# Patient Record
Sex: FEMALE | Race: WHITE | Employment: FULL TIME | URBAN - NONMETROPOLITAN AREA
[De-identification: names, ages, dates, MRNs, and addresses within clinical notes are randomized per-mention and may not be internally consistent; named-entity substitution may affect disease eponyms.]

---

## 2018-09-25 ENCOUNTER — HOSPITAL ENCOUNTER (OUTPATIENT)
Dept: PHYSICAL THERAPY | Age: 43
Setting detail: THERAPIES SERIES
Discharge: HOME | End: 2018-09-25
Payer: OTHER GOVERNMENT

## 2018-09-25 PROCEDURE — G8979 MOBILITY GOAL STATUS: HCPCS

## 2018-09-25 PROCEDURE — 97110 THERAPEUTIC EXERCISES: CPT

## 2018-09-25 PROCEDURE — 97162 PT EVAL MOD COMPLEX 30 MIN: CPT

## 2018-09-25 PROCEDURE — G0283 ELEC STIM OTHER THAN WOUND: HCPCS

## 2018-09-25 PROCEDURE — G8978 MOBILITY CURRENT STATUS: HCPCS

## 2018-09-25 ASSESSMENT — PAIN DESCRIPTION - LOCATION: LOCATION: KNEE

## 2018-09-25 ASSESSMENT — PAIN DESCRIPTION - ORIENTATION: ORIENTATION: LEFT

## 2018-09-25 ASSESSMENT — PAIN SCALES - GENERAL: PAINLEVEL_OUTOF10: 5

## 2018-09-25 NOTE — PLAN OF CARE
[facility]           Phone: [phone]             Outpatient Physical Therapy  Fax: [phone]                                           Date: 2018  Patient: Sukh Rao : 1975 CSN #: [id number]   Referring Practitioner:  Aura Ayala NP Referral Date:  18       [x] Plan of Care   [] Updated Plan of Care    Dates of Service to Include: 2018 to 18    Diagnosis:  lateral dislocation of left patella (S83.015D)    Rehab (Treatment) Diagnosis:  L knee pain, difficulty walking             Onset Date:  18    Attendance  Total # of Visits to Date: 1 No Show: 0 Canceled Appointment: 0    Assessment  Assessment: Pt is a 38 y/o female who presents to PT with L knee pain following patellar dislocation which occured on 18 while dancing. Pt currently demonstrates limited knee flexion ROM, edema in L knee compared bilaterally with circumfrential measurements, gait impairments and decreased overall functional mobility. Pt ambulating with immobilizer on LLE upon arrival to PT. Ligamentous testing for L knee, primarily MCL and ACL testing, were both negative but potential limitations d/t guarding, apprehension and edema limiting accuracy of results. Pt mod/severe TTP in L medial HS region as well as medial knee joint. Patellar mobility limited both sup/inf planes as well as med/lat when compared bilaterally. Pt reports RTD on Friday, . Pt instructed in HEP for quad strengthening and knee ROM, with good understanding and return demo of all exercises. Pt will benefit from skilled PT services to address the above impairments and to work toward established functional goals.      [x] Primary Impairment :   G Code:    [x] Mobility         [] Carry        [] Body Position       [] Self Care      [] Other:   Functional Impairment Current:  [] 0%    [] 1-19% [] 20-39% [] 40-59% [x] 60-79% [] 80-99% [] 100%  Functional Impairment Goal:  [] 0%    [x] 1-19% [] 20-39% [] 40-59% [] 60-79%    [] 80-99% [] 100%  G Code Functional Impairment determined by:  [x] Clinical Judgment   [] Outcome Measure:     Goals  Short term goals  Time Frame for Short term goals: 3 weeks  Short term goal 1: Pt will initiate HEP. Short term goal 2: Pt will improve L knee flexion ROM to >/= 75* to progress functional mobility. Long term goals  Time Frame for Long term goals : 6 weeks  Long term goal 1: Pt will be independent and compliant with HEP. Long term goal 2: Pt will demo L knee flexion AROM to be WNL compared bilaterally for improved functional mobility. Long term goal 3: Pt will ambulate household/community distances without AD and with normalized gait pattern to restore toward PLOF. Long term goal 4: Pt will improve L quad and HS strength grossly to 4+/5 for ease of squatting and stair navigation. Long term goal 5: Pt will improve L hip strength grossly to 4+/5 for improved knee stability and gait quality.      Prognosis  Prognosis: Good    Treatment Plan   Times per week: 2-3  Plan weeks: 4-6  [x]HP/CP      [x]Electrical Stim   [x]Therapeutic Exercise      [x]Gait Training  []Aquatics   [x]Ultrasound         [x]Patient Education/HEP   [x]Manual Therapy  []Traction    [x]Neuro-melquiades        [x]Soft Tissue Mobs            []Home TENS  []Iontophoresis    []Orthotic casting/fitting      []Dry Needling             Electronically signed by: Luh Stein PT, DPT    Date: 9/25/2018      ______________________________________ Date: 9/25/2018   Physician Signature

## 2018-09-25 NOTE — PROGRESS NOTES
Measures  Special Tests: No increased laxity compared bilaterally with Lachmans testing for ACL, however, edema and HS guarding/apprehension potentially impacting accuracy of results. MCL testing also no increased laxity compared bilaterally, but again edema and apprehension present and potentially limiting factors. Assessment  Assessment: Pt is a 38 y/o female who presents to PT with L knee pain following patellar dislocation which occured on 9/14/18 while dancing. Pt currently demonstrates limited knee flexion ROM, edema in L knee compared bilaterally with circumfrential measurements, gait impairments and decreased overall functional mobility. Pt ambulating with immobilizer on LLE upon arrival to PT. Ligamentous testing for L knee, primarily MCL and ACL testing, were both negative but potential limitations d/t guarding, apprehension and edema limiting accuracy of results. Pt mod/severe TTP in L medial HS region as well as medial knee joint. Patellar mobility limited both sup/inf planes as well as med/lat when compared bilaterally. Pt reports RTD on Friday, Sept. 28th. Pt instructed in HEP for quad strengthening and knee ROM, with good understanding and return demo of all exercises. Pt will benefit from skilled PT services to address the above impairments and to work toward established functional goals. Prognosis: Good        Decision Making: Medium Complexity    Patient Education  Educated on PT POC and HEP. Pt verbalized/demonstrated good understanding:     [x] Yes         [] No, pt required further clarification.       [x] Primary Impairment :   G Code:    [x] Mobility         [] Carry        [] Body Position       [] Self Care      [] Other:   Functional Impairment Current:  [] 0%    [] 1-19% [] 20-39% [] 40-59% [x] 60-79%    [] 80-99% [] 100%  Functional Impairment Goal:  [] 0%    [x] 1-19% [] 20-39% [] 40-59% [] 60-79%    [] 80-99% [] 100%  G Code Functional Impairment determined by:  [x]

## 2018-10-01 ENCOUNTER — APPOINTMENT (OUTPATIENT)
Dept: PHYSICAL THERAPY | Age: 43
End: 2018-10-01
Payer: OTHER GOVERNMENT

## 2018-10-02 ENCOUNTER — HOSPITAL ENCOUNTER (OUTPATIENT)
Dept: PHYSICAL THERAPY | Age: 43
Setting detail: THERAPIES SERIES
Discharge: HOME | End: 2018-10-02
Payer: OTHER GOVERNMENT

## 2018-10-02 PROCEDURE — 97110 THERAPEUTIC EXERCISES: CPT

## 2018-10-02 PROCEDURE — G0283 ELEC STIM OTHER THAN WOUND: HCPCS
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
## 2018-10-03 ENCOUNTER — APPOINTMENT (OUTPATIENT)
Dept: PHYSICAL THERAPY | Age: 43
End: 2018-10-03
Payer: OTHER GOVERNMENT

## 2018-10-04 ENCOUNTER — HOSPITAL ENCOUNTER (OUTPATIENT)
Dept: PHYSICAL THERAPY | Age: 43
Setting detail: THERAPIES SERIES
Discharge: HOME | End: 2018-10-04
Payer: OTHER GOVERNMENT

## 2018-10-04 PROCEDURE — G0283 ELEC STIM OTHER THAN WOUND: HCPCS

## 2018-10-04 PROCEDURE — 97140 MANUAL THERAPY 1/> REGIONS: CPT

## 2018-10-04 PROCEDURE — 97110 THERAPEUTIC EXERCISES: CPT

## 2018-10-04 ASSESSMENT — PAIN SCALES - GENERAL: PAINLEVEL_OUTOF10: 2

## 2018-10-04 NOTE — PROGRESS NOTES
[]Met   []Partially met  []Not met   Short term goal 2: Pt will improve L knee flexion ROM to >/= 75* to progress functional mobility. []Met   []Partially met  []Not met      []Met   []Partially met  []Not met      []Met   []Partially met  []Not met     Long Term Goals - Time Frame for Long term goals : 6 weeks  Long term goal 1: Pt will be independent and compliant with HEP. []Met  []Partially met  []Not met   Long term goal 2: Pt will demo L knee flexion AROM to be WNL compared bilaterally for improved functional mobility. []Met  []Partially met  []Not met   Long term goal 3: Pt will ambulate household/community distances without AD and with normalized gait pattern to restore toward PLOF. []Met  []Partially met  []Not met   Long term goal 4: Pt will improve L quad and HS strength grossly to 4+/5 for ease of squatting and stair navigation. []Met  []Partially met  []Not met   Long term goal 5: Pt will improve L hip strength grossly to 4+/5 for improved knee stability and gait quality.   []Met  []Partially met  []Not met       Minutes Tracking:  Time In: 7563  Time Out: [address]  Minutes: 40    Claudeen Clamp A AdelspergerPTA Date: 10/4/2018

## 2018-10-08 ENCOUNTER — HOSPITAL ENCOUNTER (OUTPATIENT)
Dept: PHYSICAL THERAPY | Age: 43
Setting detail: THERAPIES SERIES
Discharge: HOME | End: 2018-10-08
Payer: OTHER GOVERNMENT

## 2018-10-08 PROCEDURE — 97140 MANUAL THERAPY 1/> REGIONS: CPT

## 2018-10-08 PROCEDURE — 97110 THERAPEUTIC EXERCISES: CPT

## 2018-10-08 PROCEDURE — G0283 ELEC STIM OTHER THAN WOUND: HCPCS

## 2018-10-10 ENCOUNTER — HOSPITAL ENCOUNTER (OUTPATIENT)
Dept: PHYSICAL THERAPY | Age: 43
Setting detail: THERAPIES SERIES
Discharge: HOME | End: 2018-10-10
Payer: OTHER GOVERNMENT

## 2018-10-10 PROCEDURE — 97110 THERAPEUTIC EXERCISES: CPT

## 2018-10-10 PROCEDURE — 97035 APP MDLTY 1+ULTRASOUND EA 15: CPT

## 2018-10-10 PROCEDURE — G0283 ELEC STIM OTHER THAN WOUND: HCPCS

## 2018-10-10 NOTE — PROGRESS NOTES
Phone: Khushi           Fax: [phone]                           Outpatient Physical Therapy                                                                            Daily Note    Patient: Sandra Leger : 1975  [facility] #: [id number]   Referring Practitioner:  Christiano Jarquni NP    Referral Date : 18     Date: 10/10/2018    Diagnosis: lateral dislocation of left patella (S83.015D)  Treatment Diagnosis: L knee pain, difficulty walking    Onset Date: 18  PT Insurance Information: [place]  Total # of Visits Approved: 14 Per Physician Order  Total # of Visits to Date: 4  No Show: 0  Canceled Appointment: 0      Pre-Treatment Pain:  5/10  Subjective: Pt states pain level 5/10 in L knee -- med/lat knee cap area. As day goes on pain increases and knee swells. Goes to  E Mely Mcclure MD today. Exercises:  Exercise 1: HEP: quad sets, SLR, heel slides  Exercise 2: Scifit: no resistance, 8 min  Exercise 3: SLR x 15  Exercise 4: Bridge 15           Exercise 8: Min knee bends, toe raises and hip flexion   Exercise 9: ABD, HIP ext, HS x 15 each B  Exercise 10: TKE x 15  Blue cord. Manual:  Joint mobilization: L pat mobs  PROM: L knee within tolerable limits                 Modalities:      Cryotherapy (Minutes\Location): x15min L knee   Ultrasound: 1.1 wcm2 x 8 min about patella to decrease pain   E-stim (parameters): IFC+CP x15min for L knee pain and edema                 Assessment  Assessment: Girth suprapat R 51 cm , L 52 cm. Supine ROM 0-103 degrees. Very tender lat inferior patella. US per POC to patellar areas to reduce pain. Continues to wear L knee immobilizer when up. Patient Education  Reviewed HEP  Pt verbalized/demonstrated good understanding:     [x] Yes         [] No, pt required further clarification.     Post Treatment Pain:  4/10      Plan  Times per week: 2-3  Plan weeks: 4-6      Goals  (Total # of Visits to Date: 4)

## 2018-10-12 ENCOUNTER — HOSPITAL ENCOUNTER (OUTPATIENT)
Dept: PHYSICAL THERAPY | Age: 43
Setting detail: THERAPIES SERIES
Discharge: HOME | End: 2018-10-12
Payer: OTHER GOVERNMENT

## 2018-10-12 PROCEDURE — G0283 ELEC STIM OTHER THAN WOUND: HCPCS

## 2018-10-12 PROCEDURE — 97035 APP MDLTY 1+ULTRASOUND EA 15: CPT

## 2018-10-15 NOTE — PROGRESS NOTES
Short term goal 1: Pt will initiate HEP--met                                        [x]Met   []Partially met  []Not met   Short term goal 2: Pt will improve L knee flexion ROM to >/= 75* to progress functional mobility. --met  [x]Met   []Partially met  []Not met      []Met   []Partially met  []Not met      []Met   []Partially met  []Not met     Long Term Goals - Time Frame for Long term goals : 6 weeks  Long term goal 1: Pt will be independent and compliant with HEP. []Met  []Partially met  []Not met   Long term goal 2: Pt will demo L knee flexion AROM to be WNL compared bilaterally for improved functional mobility. []Met  []Partially met  []Not met   Long term goal 3: Pt will ambulate household/community distances without AD and with normalized gait pattern to restore toward PLOF. []Met  []Partially met  []Not met   Long term goal 4: Pt will improve L quad and HS strength grossly to 4+/5 for ease of squatting and stair navigation. []Met  []Partially met  []Not met   Long term goal 5: Pt will improve L hip strength grossly to 4+/5 for improved knee stability and gait quality.   []Met  []Partially met  []Not met       Minutes Tracking:  Time In: [address] [facility]  Time Out: [address]  Minutes: [facility] PT, DPT Date: 10/12/2018

## 2018-10-17 ENCOUNTER — HOSPITAL ENCOUNTER (OUTPATIENT)
Dept: PHYSICAL THERAPY | Age: 43
Setting detail: THERAPIES SERIES
End: 2018-10-17
Payer: OTHER GOVERNMENT

## 2018-10-19 ENCOUNTER — APPOINTMENT (OUTPATIENT)
Dept: PHYSICAL THERAPY | Age: 43
End: 2018-10-19
Payer: OTHER GOVERNMENT

## 2018-10-22 ENCOUNTER — APPOINTMENT (OUTPATIENT)
Dept: PHYSICAL THERAPY | Age: 43
End: 2018-10-22
Payer: OTHER GOVERNMENT

## 2018-10-23 ENCOUNTER — APPOINTMENT (OUTPATIENT)
Dept: PHYSICAL THERAPY | Age: 43
End: 2018-10-23
Payer: OTHER GOVERNMENT

## 2018-10-24 ENCOUNTER — APPOINTMENT (OUTPATIENT)
Dept: PHYSICAL THERAPY | Age: 43
End: 2018-10-24
Payer: OTHER GOVERNMENT

## 2018-10-26 ENCOUNTER — APPOINTMENT (OUTPATIENT)
Dept: PHYSICAL THERAPY | Age: 43
End: 2018-10-26
Payer: OTHER GOVERNMENT

## 2018-10-29 ENCOUNTER — APPOINTMENT (OUTPATIENT)
Dept: PHYSICAL THERAPY | Age: 43
End: 2018-10-29
Payer: OTHER GOVERNMENT

## 2018-10-31 ENCOUNTER — APPOINTMENT (OUTPATIENT)
Dept: PHYSICAL THERAPY | Age: 43
End: 2018-10-31
Payer: OTHER GOVERNMENT

## 2018-11-28 ENCOUNTER — APPOINTMENT (OUTPATIENT)
Dept: GENERAL RADIOLOGY | Age: 43
End: 2018-11-28
Payer: COMMERCIAL

## 2018-11-28 ENCOUNTER — HOSPITAL ENCOUNTER (EMERGENCY)
Age: 43
Discharge: HOME | End: 2018-11-28
Attending: EMERGENCY MEDICINE
Payer: COMMERCIAL

## 2018-11-28 ENCOUNTER — APPOINTMENT (OUTPATIENT)
Dept: CT IMAGING | Age: 43
End: 2018-11-28
Payer: COMMERCIAL

## 2018-11-28 ENCOUNTER — HOSPITAL ENCOUNTER (OUTPATIENT)
Dept: NON INVASIVE DIAGNOSTICS | Age: 43
Discharge: HOME | End: 2018-11-28
Payer: COMMERCIAL

## 2018-11-28 VITALS
RESPIRATION RATE: 12 BRPM | SYSTOLIC BLOOD PRESSURE: 142 MMHG | HEART RATE: 105 BPM | HEIGHT: 67 IN | OXYGEN SATURATION: 100 % | WEIGHT: 238 LBS | TEMPERATURE: 98.7 F | BODY MASS INDEX: 37.35 KG/M2 | DIASTOLIC BLOOD PRESSURE: 78 MMHG

## 2018-11-28 DIAGNOSIS — F41.1 ANXIETY STATE: ICD-10-CM

## 2018-11-28 DIAGNOSIS — R00.0 SINUS TACHYCARDIA: Primary | ICD-10-CM

## 2018-11-28 DIAGNOSIS — R00.0 SINUS TACHYCARDIA: ICD-10-CM

## 2018-11-28 DIAGNOSIS — R07.9 CHEST PAIN, UNSPECIFIED TYPE: ICD-10-CM

## 2018-11-28 DIAGNOSIS — R00.2 PALPITATIONS: Primary | ICD-10-CM

## 2018-11-28 LAB
ABSOLUTE EOS #: 0.11 K/UL (ref 0–0.44)
ABSOLUTE IMMATURE GRANULOCYTE: 0.03 K/UL (ref 0–0.3)
ABSOLUTE LYMPH #: 2.39 K/UL (ref 1.1–3.7)
ABSOLUTE MONO #: 0.6 K/UL (ref 0.1–1.2)
ALBUMIN SERPL-MCNC: 4.8 G/DL (ref 3.5–5.2)
ALBUMIN/GLOBULIN RATIO: 1.5 (ref 1–2.5)
ALP BLD-CCNC: 61 U/L (ref 35–104)
ALT SERPL-CCNC: 10 U/L (ref 5–33)
ANION GAP SERPL CALCULATED.3IONS-SCNC: 15 MMOL/L (ref 9–17)
AST SERPL-CCNC: 11 U/L
BASOPHILS # BLD: 1 % (ref 0–2)
BASOPHILS ABSOLUTE: 0.05 K/UL (ref 0–0.2)
BILIRUB SERPL-MCNC: 0.21 MG/DL (ref 0.3–1.2)
BNP INTERPRETATION: NORMAL
BUN BLDV-MCNC: 13 MG/DL (ref 6–20)
BUN/CREAT BLD: 18 (ref 9–20)
CALCIUM SERPL-MCNC: 9.6 MG/DL (ref 8.6–10.4)
CHLORIDE BLD-SCNC: 102 MMOL/L (ref 98–107)
CO2: 21 MMOL/L (ref 20–31)
CREAT SERPL-MCNC: 0.73 MG/DL (ref 0.5–0.9)
D-DIMER QUANTITATIVE: 0.54 MG/L FEU (ref 0.19–0.5)
DIFFERENTIAL TYPE: NORMAL
EOSINOPHILS RELATIVE PERCENT: 2 % (ref 1–4)
GFR AFRICAN AMERICAN: >60 ML/MIN
GFR NON-AFRICAN AMERICAN: >60 ML/MIN
GFR SERPL CREATININE-BSD FRML MDRD: ABNORMAL ML/MIN/{1.73_M2}
GFR SERPL CREATININE-BSD FRML MDRD: ABNORMAL ML/MIN/{1.73_M2}
GLUCOSE BLD-MCNC: 98 MG/DL (ref 70–99)
HCT VFR BLD CALC: 40.9 % (ref 36.3–47.1)
HEMOGLOBIN: 13.5 G/DL (ref 11.9–15.1)
IMMATURE GRANULOCYTES: 0 %
LIPASE: 28 U/L (ref 13–60)
LV EF: 70 %
LVEF MODALITY: NORMAL
LYMPHOCYTES # BLD: 32 % (ref 24–43)
MCH RBC QN AUTO: 28.7 PG (ref 25.2–33.5)
MCHC RBC AUTO-ENTMCNC: 33 G/DL (ref 28.4–34.8)
MCV RBC AUTO: 87 FL (ref 82.6–102.9)
MONOCYTES # BLD: 8 % (ref 3–12)
NRBC AUTOMATED: 0 PER 100 WBC
PDW BLD-RTO: 12.3 % (ref 11.8–14.4)
PLATELET # BLD: 328 K/UL (ref 138–453)
PLATELET ESTIMATE: NORMAL
PMV BLD AUTO: 10 FL (ref 8.1–13.5)
POTASSIUM SERPL-SCNC: 4.1 MMOL/L (ref 3.7–5.3)
PRO-BNP: <20 PG/ML
RBC # BLD: 4.7 M/UL (ref 3.95–5.11)
RBC # BLD: NORMAL 10*6/UL
SEG NEUTROPHILS: 57 % (ref 36–65)
SEGMENTED NEUTROPHILS ABSOLUTE COUNT: 4.19 K/UL (ref 1.5–8.1)
SODIUM BLD-SCNC: 138 MMOL/L (ref 135–144)
T4 TOTAL: 6.5 UG/DL (ref 4.5–12)
TOTAL PROTEIN: 8.1 G/DL (ref 6.4–8.3)
TROPONIN INTERP: NORMAL
TROPONIN INTERP: NORMAL
TROPONIN T: <0.03 NG/ML
TROPONIN T: <0.03 NG/ML
TSH SERPL DL<=0.05 MIU/L-ACNC: 0.86 MIU/L (ref 0.3–5)
WBC # BLD: 7.4 K/UL (ref 3.5–11.3)
WBC # BLD: NORMAL 10*3/UL

## 2018-11-28 PROCEDURE — 85379 FIBRIN DEGRADATION QUANT: CPT

## 2018-11-28 PROCEDURE — 84436 ASSAY OF TOTAL THYROXINE: CPT

## 2018-11-28 PROCEDURE — 85025 COMPLETE CBC W/AUTO DIFF WBC: CPT

## 2018-11-28 PROCEDURE — 36415 COLL VENOUS BLD VENIPUNCTURE: CPT

## 2018-11-28 PROCEDURE — 83690 ASSAY OF LIPASE: CPT

## 2018-11-28 PROCEDURE — 71046 X-RAY EXAM CHEST 2 VIEWS: CPT

## 2018-11-28 PROCEDURE — 80053 COMPREHEN METABOLIC PANEL: CPT

## 2018-11-28 PROCEDURE — 93225 XTRNL ECG REC<48 HRS REC: CPT

## 2018-11-28 PROCEDURE — 99243 OFF/OP CNSLTJ NEW/EST LOW 30: CPT | Performed by: INTERNAL MEDICINE

## 2018-11-28 PROCEDURE — 84484 ASSAY OF TROPONIN QUANT: CPT

## 2018-11-28 PROCEDURE — 93005 ELECTROCARDIOGRAM TRACING: CPT

## 2018-11-28 PROCEDURE — 99285 EMERGENCY DEPT VISIT HI MDM: CPT

## 2018-11-28 PROCEDURE — 6360000004 HC RX CONTRAST MEDICATION: Performed by: EMERGENCY MEDICINE

## 2018-11-28 PROCEDURE — 83880 ASSAY OF NATRIURETIC PEPTIDE: CPT

## 2018-11-28 PROCEDURE — 2580000003 HC RX 258: Performed by: EMERGENCY MEDICINE

## 2018-11-28 PROCEDURE — 93306 TTE W/DOPPLER COMPLETE: CPT

## 2018-11-28 PROCEDURE — 84443 ASSAY THYROID STIM HORMONE: CPT

## 2018-11-28 PROCEDURE — 71260 CT THORAX DX C+: CPT

## 2018-11-28 RX ORDER — 0.9 % SODIUM CHLORIDE 0.9 %
1000 INTRAVENOUS SOLUTION INTRAVENOUS ONCE
Status: COMPLETED | OUTPATIENT
Start: 2018-11-28 | End: 2018-11-28

## 2018-11-28 RX ORDER — CARVEDILOL 6.25 MG/1
6.25 TABLET ORAL 2 TIMES DAILY WITH MEALS
COMMUNITY

## 2018-11-28 RX ADMIN — IOPAMIDOL 75 ML: 755 INJECTION, SOLUTION INTRAVENOUS at 13:45

## 2018-11-28 RX ADMIN — SODIUM CHLORIDE 1000 ML: 9 INJECTION, SOLUTION INTRAVENOUS at 10:37

## 2018-11-28 RX ADMIN — SODIUM CHLORIDE 1000 ML: 9 INJECTION, SOLUTION INTRAVENOUS at 14:07

## 2018-11-28 ASSESSMENT — PAIN SCALES - GENERAL: PAINLEVEL_OUTOF10: 4

## 2018-11-28 ASSESSMENT — PAIN DESCRIPTION - DESCRIPTORS: DESCRIPTORS: BURNING;ACHING

## 2018-11-28 ASSESSMENT — PAIN DESCRIPTION - ORIENTATION: ORIENTATION: LEFT;ANTERIOR

## 2018-11-28 ASSESSMENT — PAIN DESCRIPTION - FREQUENCY: FREQUENCY: INTERMITTENT

## 2018-11-28 ASSESSMENT — PAIN DESCRIPTION - PAIN TYPE: TYPE: ACUTE PAIN

## 2018-11-28 ASSESSMENT — PAIN DESCRIPTION - LOCATION: LOCATION: CHEST

## 2018-11-28 NOTE — ED PROVIDER NOTES
Value Ref Range    D-Dimer, Quant 0.54 (H) 0.19 - 0.50 mg/L FEU   Brain natriuretic peptide   Result Value Ref Range    Pro-BNP <20 <300 pg/mL    BNP Interpretation         Lipase   Result Value Ref Range    Lipase 28 13 - 60 U/L   Troponin   Result Value Ref Range    Troponin T <0.03 <0.03 ng/mL    Troponin Interp         TSH without Reflex   Result Value Ref Range    TSH 0.86 0.30 - 5.00 mIU/L   T4   Result Value Ref Range    T4, Total 6.5 4.5 - 12.0 ug/dL   EKG 12 Lead   Result Value Ref Range    Ventricular Rate 98 BPM    Atrial Rate 98 BPM    P-R Interval 156 ms    QRS Duration 88 ms    Q-T Interval 350 ms    QTc Calculation (Bazett) 446 ms    P Axis 57 degrees    R Axis 19 degrees    T Axis 24 degrees   EKG 12 Lead   Result Value Ref Range    Ventricular Rate 104 BPM    Atrial Rate 104 BPM    P-R Interval 180 ms    QRS Duration 84 ms    Q-T Interval 354 ms    QTc Calculation (Bazett) 465 ms    P Axis 34 degrees    R Axis 10 degrees    T Axis 17 degrees         All other labs were within normal range or not returned as of this dictation.     EMERGENCY DEPARTMENT COURSE andDIFFERENTIAL DIAGNOSIS/MDM:   Vitals:    Vitals:    11/28/18 1201 11/28/18 1217 11/28/18 1307 11/28/18 1359   BP: (!) 149/80 137/75 (!) 148/82 (!) 142/78   Pulse: 96 102 102 105   Resp: 12 10 12 12   Temp:       TempSrc:       SpO2: 98% 98% (!) 68% 100%   Weight:       Height:           ED Course as of Nov 28 1903 Wed Nov 28, 2018   1312 Patient still tachycardic still feels short of breath at time will order repeat CT of chest patient agrees  [BD]   1312 Risks and benefits alternatives were explained including the radiation  [BD]   1339 Spoke to Dr. Aime Carlin he'll consult with the patient  [BD]      ED Course User Index  [BD] [place] Nip, DO     Medications   0.9 % sodium chloride bolus (0 mLs Intravenous Stopped 11/28/18 1146)   0.9 % sodium chloride bolus (0 mLs Intravenous Stopped 11/28/18 1507)   iopamidol (ISOVUE-370) 76 % injection 75 mL (75 mLs Intravenous Given 11/28/18 1345)       HARRY. I reviewed records from care everywhere and I have included this below  BRIEF HISTORY PRIOR TO ADMISSION:   About 4 weeks ago she dislocated her L patella dancing. She was subsequently mostly immobilized and had recently began PT. Friday 10/12 she began to note exertional crushing chest pain and dyspnea. She initially attributed this to panic attacks. However symptoms continued to progress and ultimately she sought care [facility] ED 10/13/2018. There she was non-hypoxic but tachycardic to 130s. BP normal. EKG showed RBBB. CT PE was obtained and demonstrated bilateral PEs with evidence of R heart strain. Given concern for acute decompensation patient received tPA. She was subsequently transferred to [address] [place] [address] for further management. HOSPITAL COURSE BY PROBLEM LIST:   Acute pulmonary embolism with cor pulmonale, submassive c/b R heart strain, RBBB, troponinemia: PE likely provoked in setting of knee injury, denies OCP use, family history positive for stroke in half brother. On admission, d-dimer elevated, EKG with RBBB. CTPE with extensive clot burden in bilateral pulmonary arteries, L > R, RV:LV ratio 1.55, trop peak at 0.41. Repeat CTPE with PE originating at pulmonary trunk bifurcation and extending bilaterally. LE venous duplex with DVT in popliteal and peroneal veins. TTE w/ EF 62% and RSVP 30-35 mmHg. Patient was initially started on heparin gtt and transitioned to xarelto. Patient instructed to follow up with pulmonary hypertension outpatient.       It appears that this was the PE was from an injury and immobilization      I think patient most likely is suffering from pain from a massive PE that she recently had. Her lungs and body is probably try to tolerate change it. Her blood pressures is negligible        REVAL:     Unremarkable workup. I do not think she needs a  chest CT. She is awake alert comfortable.   We discussed the risks and benefits of

## 2018-11-28 NOTE — CONSULTS
Gabapentin and Cymbalta [duloxetine hcl] REVIEW OF SYSTEMS: 14 systems were reviewed. Pertinent positives and negatives as above, all else negative. Past Surgical History:   Procedure Laterality Date    TUBAL LIGATION      Social History:  Social History   Substance Use Topics    Smoking status: Never Smoker    Smokeless tobacco: Never Used    Alcohol use Not on file        CURRENT MEDICATIONS:        Outpatient Prescriptions Marked as Taking for the 11/28/18 encounter [facility] HOSPITAL Encounter)   Medication Sig Dispense Refill    rivaroxaban (XARELTO) 20 MG TABS tablet Take 20 mg by mouth daily (with breakfast)      carvedilol (COREG) 6.25 MG tablet Take 6.25 mg by mouth 2 times daily (with meals)         FAMILY HISTORY: No significant family history of premature CAD. Physical Examination:     BP (!) 142/78   Pulse 105   Temp 98.7 °F (37.1 °C) (Tympanic)   Resp 12   Ht 5' 7\" (1.702 m)   Wt 238 lb (108 kg)   LMP 11/01/2018   SpO2 100%   BMI 37.28 kg/m²  Body mass index is 37.28 kg/m². Constitutional: She is oriented to person, place, and time. She appears well-developed and well-nourished. In no acute distress. HEENT: Normocephalic and atraumatic. No JVD present. Carotid bruit is not present. No mass and no thyromegaly present. No lymphadenopathy present. Cardiovascular: Tachycardic rate, regular rhythm, normal heart sounds. Exam reveals no gallop and no friction rubs. No heart murmur heard. Pulmonary/Chest: Effort normal and breath sounds normal. No respiratory distress. She has no wheezes, rhonchi or rales. Abdominal: Soft, non-tender. Bowel sounds and aorta are normal. She exhibits no organomegaly, mass or bruit. Extremities: No edema. No cyanosis and no clubbing. Pulses are 2+ radial and carotid pulses. 2+ dorsalis pedis and posterior tibial pulses bilaterally. Neurological: She is alert and oriented to person, place, and time. No evidence of gross cranial nerve deficit.  Coordination

## 2018-11-29 LAB
EKG ATRIAL RATE: 104 BPM
EKG ATRIAL RATE: 98 BPM
EKG P AXIS: 34 DEGREES
EKG P AXIS: 57 DEGREES
EKG P-R INTERVAL: 156 MS
EKG P-R INTERVAL: 180 MS
EKG Q-T INTERVAL: 350 MS
EKG Q-T INTERVAL: 354 MS
EKG QRS DURATION: 84 MS
EKG QRS DURATION: 88 MS
EKG QTC CALCULATION (BAZETT): 446 MS
EKG QTC CALCULATION (BAZETT): 465 MS
EKG R AXIS: 10 DEGREES
EKG R AXIS: 19 DEGREES
EKG T AXIS: 17 DEGREES
EKG T AXIS: 24 DEGREES
EKG VENTRICULAR RATE: 104 BPM
EKG VENTRICULAR RATE: 98 BPM

## 2018-12-06 ENCOUNTER — OFFICE VISIT (OUTPATIENT)
Dept: CARDIOLOGY | Age: 43
End: 2018-12-06
Payer: COMMERCIAL

## 2018-12-06 VITALS
HEART RATE: 89 BPM | DIASTOLIC BLOOD PRESSURE: 85 MMHG | OXYGEN SATURATION: 99 % | WEIGHT: 243.8 LBS | HEIGHT: 67 IN | BODY MASS INDEX: 38.27 KG/M2 | SYSTOLIC BLOOD PRESSURE: 137 MMHG

## 2018-12-06 DIAGNOSIS — Z86.711 HISTORY OF PULMONARY EMBOLISM: ICD-10-CM

## 2018-12-06 DIAGNOSIS — R00.0 TACHYCARDIA: ICD-10-CM

## 2018-12-06 DIAGNOSIS — F41.9 ANXIETY: ICD-10-CM

## 2018-12-06 DIAGNOSIS — I10 ESSENTIAL HYPERTENSION: ICD-10-CM

## 2018-12-06 DIAGNOSIS — R07.89 NON-CARDIAC CHEST PAIN: Primary | ICD-10-CM

## 2018-12-06 PROCEDURE — 99213 OFFICE O/P EST LOW 20 MIN: CPT | Performed by: INTERNAL MEDICINE

## 2018-12-06 PROCEDURE — 93000 ELECTROCARDIOGRAM COMPLETE: CPT | Performed by: INTERNAL MEDICINE

## 2018-12-06 NOTE — PROGRESS NOTES
present. Cardiovascular: Normal rate, regular rhythm, normal heart sounds. Exam reveals no gallop and no friction rubs. No heart murmur heard. Pulmonary/Chest: Effort normal and breath sounds normal. No respiratory distress. She has no wheezes, rhonchi or rales. Abdominal: Soft, non-tender. Bowel sounds and aorta are normal. She exhibits no organomegaly, mass or bruit. Extremities: No edema. No cyanosis and no clubbing. Pulses are 2+ radial and carotid pulses. 2+ dorsalis pedis and posterior tibial pulses bilaterally. Neurological: She is alert and oriented to person, place, and time. No evidence of gross cranial nerve deficit. Coordination appeared normal.   Skin: Skin is warm and dry. There is no rash or diaphoresis. Psychiatric: She has a normal mood and affect. Her speech is normal and behavior is normal.      MOST RECENT LABS ON RECORD:   Lab Results   Component Value Date    WBC 7.4 11/28/2018    HGB 13.5 11/28/2018    HCT 40.9 11/28/2018     11/28/2018    ALT 10 11/28/2018    AST 11 11/28/2018     11/28/2018    K 4.1 11/28/2018     11/28/2018    CREATININE 0.73 11/28/2018    BUN 13 11/28/2018    CO2 21 11/28/2018    TSH 0.86 11/28/2018       ASSESSMENT:     1. Non-cardiac chest pain    2. Essential hypertension    3. Tachycardia    4. History of pulmonary embolism    5. Anxiety         PLAN:        · Noncardiac Chest Pain  ? Current chest pain is noncardiac.   ? Localized pain that radiates to the back. No relation to exertion. Not associated with any sweating or diaphoresis. The pain is better now. ? ECGs reviewed, no acute ischemic changes. ? Echo reviewed, normal ejection fraction and wall motion. ? Her only risk factors for CAD is hypertension. She has no history of diabetes, dyslipidemia, no significant smoking history and no family history of premature CAD. ?  I reassured the patient but because of her atypical symptoms  And low risk profile I don't think ischemic workup is indicated at this point. · Counseling: I advised Ms. Moncada to call our office or go to the emergency room if she develops worsening or persistent chest pain or shortness of breath as this could be life threatening.     · Essential Hypertension: Controlled  · Beta Blocker: Continue carvedilol (Coreg) 6.25 mg twice daily        · Tachycardia:   · Echo and CTA of the chest reviewed as above. ? Beta Blocker: Continue carvedilol (Coreg) 6.25 mg twice daily. ? Normal TSH. ? Reviewed the results of her Holter monitor, no significant tachycardia on Holter. ? Multiple complaints during her Holter monitoring did not correlate with any heart rate or rhythm abnormalities. · History of pulmonary Embolism  ? Continue Xarelto 20 mg daily. ? No evidence of pulmonary hypertension on echo. In the meantime, I encouraged Ms. Moncada to continue to take her other medications. FOLLOW UP:   I told Ms. Moncada to call my office if she had any problems, but otherwise I asked her to Return if symptoms worsen or fail to improve, for Follow up. However, I would be happy to see her sooner should the need arise. Sincerely,  Sree Mason MD, F.A.C.C. [facility] Cardiology Specialist    [address] [place] [address], [address] [place] [address]  Phone: [phone], Fax: [phone]     I believe that the risk of significant morbidity and mortality related to the patient's current medical conditions are: Intermediate. 25 minuteswere spent with the patient and all of her questions were answered. The documentation recorded by the scribe, accurately and completely reflects the services I personally performed and the decisions made by me. Jennifer Carrion MD, F.A.C.C.  December 6, 2018

## 2018-12-06 NOTE — PATIENT INSTRUCTIONS
SURVEY:    You may be receiving a survey from [email or web address] regarding your visit today. Please complete the survey to enable us to provide the highest quality of care to you and your family. If you cannot score us a very good on any question, please call the office to discuss how we could have made your experience a very good one. Thank you.

## 2019-03-04 ENCOUNTER — HOSPITAL ENCOUNTER (OUTPATIENT)
Dept: GENERAL RADIOLOGY | Age: 44
Discharge: HOME | End: 2019-03-06
Payer: COMMERCIAL

## 2019-03-04 DIAGNOSIS — R52 PAIN: ICD-10-CM

## 2019-03-04 PROCEDURE — 71046 X-RAY EXAM CHEST 2 VIEWS: CPT

## 2019-03-05 ENCOUNTER — HOSPITAL ENCOUNTER (OUTPATIENT)
Dept: NUCLEAR MEDICINE | Age: 44
Discharge: HOME | End: 2019-03-07
Payer: COMMERCIAL

## 2019-03-05 DIAGNOSIS — I26.02 ACUTE SADDLE PULMONARY EMBOLISM WITH ACUTE COR PULMONALE (HCC): ICD-10-CM

## 2019-03-05 PROCEDURE — 78582 LUNG VENTILAT&PERFUS IMAGING: CPT

## 2019-03-05 PROCEDURE — A9540 TC99M MAA: HCPCS | Performed by: INTERNAL MEDICINE

## 2019-03-05 PROCEDURE — A9539 TC99M PENTETATE: HCPCS | Performed by: INTERNAL MEDICINE

## 2019-03-05 PROCEDURE — 3430000000 HC RX DIAGNOSTIC RADIOPHARMACEUTICAL: Performed by: INTERNAL MEDICINE

## 2019-03-05 RX ORDER — KIT FOR THE PREPARATION OF TECHNETIUM TC 99M PENTETATE 20 MG/1
35 INJECTION, POWDER, LYOPHILIZED, FOR SOLUTION INTRAVENOUS; RESPIRATORY (INHALATION)
Status: COMPLETED | OUTPATIENT
Start: 2019-03-05 | End: 2019-03-05

## 2019-03-05 RX ADMIN — KIT FOR THE PREPARATION OF TECHNETIUM TC 99M PENTETATE 35 MILLICURIE: 20 INJECTION, POWDER, LYOPHILIZED, FOR SOLUTION INTRAVENOUS; RESPIRATORY (INHALATION) at 08:54

## 2019-03-05 RX ADMIN — Medication 5 MILLICURIE: at 09:04

## 2022-01-20 PROBLEM — Z12.11 ENCOUNTER FOR SCREENING COLONOSCOPY: Status: ACTIVE | Noted: 2022-01-20

## 2022-03-19 PROBLEM — Z12.11 COLON CANCER SCREENING: Status: RESOLVED | Noted: 2022-01-20 | Resolved: 2022-03-19
